# Patient Record
Sex: FEMALE | URBAN - METROPOLITAN AREA
[De-identification: names, ages, dates, MRNs, and addresses within clinical notes are randomized per-mention and may not be internally consistent; named-entity substitution may affect disease eponyms.]

---

## 2019-12-16 ENCOUNTER — APPOINTMENT (RX ONLY)
Dept: URBAN - METROPOLITAN AREA OTHER 10 | Facility: OTHER | Age: 74
Setting detail: DERMATOLOGY
End: 2019-12-16

## 2019-12-16 DIAGNOSIS — L28.0 LICHEN SIMPLEX CHRONICUS: ICD-10-CM

## 2019-12-16 PROBLEM — M12.9 ARTHROPATHY, UNSPECIFIED: Status: ACTIVE | Noted: 2019-12-16

## 2019-12-16 PROBLEM — E78.5 HYPERLIPIDEMIA, UNSPECIFIED: Status: ACTIVE | Noted: 2019-12-16

## 2019-12-16 PROCEDURE — ? PRESCRIPTION

## 2019-12-16 PROCEDURE — 99202 OFFICE O/P NEW SF 15 MIN: CPT

## 2019-12-16 PROCEDURE — ? COUNSELING

## 2019-12-16 PROCEDURE — ? TREATMENT REGIMEN

## 2019-12-16 RX ORDER — CLOBETASOL PROPIONATE 0.5 MG/G
CREAM TOPICAL BID
Qty: 60 | Refills: 1 | Status: ERX | COMMUNITY
Start: 2019-12-16

## 2019-12-16 RX ADMIN — CLOBETASOL PROPIONATE: 0.5 CREAM TOPICAL at 00:00

## 2019-12-16 ASSESSMENT — LOCATION SIMPLE DESCRIPTION DERM: LOCATION SIMPLE: RIGHT ELBOW

## 2019-12-16 ASSESSMENT — LOCATION DETAILED DESCRIPTION DERM: LOCATION DETAILED: RIGHT ELBOW

## 2019-12-16 ASSESSMENT — LOCATION ZONE DERM: LOCATION ZONE: ARM

## 2019-12-16 NOTE — HPI: DISCOLORATION
How Severe Is Your Skin Discoloration?: moderate
Additional History: Patient states the lesions are painful and she was given Mupirocin but there was no change

## 2023-03-20 ENCOUNTER — OFFICE VISIT (OUTPATIENT)
Dept: URBAN - METROPOLITAN AREA CLINIC 74 | Facility: CLINIC | Age: 78
End: 2023-03-20

## 2023-03-20 PROBLEM — 71457002: Status: ACTIVE | Noted: 2023-03-20

## 2023-03-20 NOTE — HPI-TODAY'S VISIT:
The patient is 77-year-old female with past medical history as noted below is presenting to our clinic today to schedule Colonoscopy. The patient was seen on 03/06/2023 in ED at  for right flank pain. She was aslo seen on 03/17/2023 and admitted to  for UTI and headache. She was seen by [facility] during this admission.  Labs and imaging as noted below.     Diagnostic studies: -- MBS on 03/20/2023   -- Labs on 03/20/2023 CBC with WBC 16.93, hemoglobin 13.9, hematocrit 41.8, and platelet 13.7.  BMP with glucose of 189, BUN 14, and creatinine 1.78.  -- CT scan on 03/06/2023 with stomach is unremarkable. Small and large bowel loops are of normal caliber. No free intraperitoneal air or free fluid is demonstrated. The mesentery is unremarkable. Scattered colonic diverticulosis without evidence of diverticulitis  The appendix is normal in appearance. No findings of an acute intra-abdominal or pelvic abnormality. Specifically no findings of hydronephrosis or urolithiasis. Indication for right flank pain. Atherosclerotic aortic disease.   -- Labs on 03/06/2023 CBC Hgb 15.4, Hct 44.6, and . BMP with Creatinine 0.67, Glucose 240, and Potassium 5.7. COVID test negative. UA with positive for glucose and ketones.

## 2023-04-18 PROBLEM — 266434009: Status: ACTIVE | Noted: 2023-04-18

## 2023-05-10 ENCOUNTER — LAB OUTSIDE AN ENCOUNTER (OUTPATIENT)
Dept: URBAN - METROPOLITAN AREA CLINIC 74 | Facility: CLINIC | Age: 78
End: 2023-05-10

## 2023-05-10 ENCOUNTER — OFFICE VISIT (OUTPATIENT)
Dept: URBAN - METROPOLITAN AREA CLINIC 74 | Facility: CLINIC | Age: 78
End: 2023-05-10
Payer: MEDICARE

## 2023-05-10 ENCOUNTER — WEB ENCOUNTER (OUTPATIENT)
Dept: URBAN - METROPOLITAN AREA CLINIC 74 | Facility: CLINIC | Age: 78
End: 2023-05-10

## 2023-05-10 ENCOUNTER — TELEPHONE ENCOUNTER (OUTPATIENT)
Dept: URBAN - METROPOLITAN AREA CLINIC 74 | Facility: CLINIC | Age: 78
End: 2023-05-10

## 2023-05-10 VITALS
BODY MASS INDEX: 32.59 KG/M2 | HEIGHT: 65 IN | WEIGHT: 195.6 LBS | HEART RATE: 94 BPM | SYSTOLIC BLOOD PRESSURE: 120 MMHG | DIASTOLIC BLOOD PRESSURE: 60 MMHG | TEMPERATURE: 97.3 F

## 2023-05-10 DIAGNOSIS — R10.824 LEFT LOWER QUADRANT ABDOMINAL TENDERNESS WITH REBOUND TENDERNESS: ICD-10-CM

## 2023-05-10 DIAGNOSIS — K21.9 CHRONIC GASTROESOPHAGEAL REFLUX DISEASE: ICD-10-CM

## 2023-05-10 DIAGNOSIS — R10.31 RLQ ABDOMINAL PAIN: ICD-10-CM

## 2023-05-10 DIAGNOSIS — J69.0 ASPIRATION PNEUMONIA OF BOTH LUNGS, UNSPECIFIED ASPIRATION PNEUMONIA TYPE, UNSPECIFIED PART OF LUNG: ICD-10-CM

## 2023-05-10 DIAGNOSIS — K59.09 CHRONIC CONSTIPATION: ICD-10-CM

## 2023-05-10 DIAGNOSIS — K57.90 DIVERTICULOSIS: ICD-10-CM

## 2023-05-10 DIAGNOSIS — K22.4 ESOPHAGEAL DYSKINESIA: ICD-10-CM

## 2023-05-10 DIAGNOSIS — R10.32 LLQ ABDOMINAL PAIN: ICD-10-CM

## 2023-05-10 PROBLEM — 235595009: Status: ACTIVE | Noted: 2023-05-10

## 2023-05-10 PROBLEM — 307496006: Status: ACTIVE | Noted: 2023-05-10

## 2023-05-10 PROBLEM — 397881000: Status: ACTIVE | Noted: 2023-05-10

## 2023-05-10 PROCEDURE — 99204 OFFICE O/P NEW MOD 45 MIN: CPT | Performed by: PHYSICIAN ASSISTANT

## 2023-05-10 RX ORDER — POLYETHYLENE GLYCOL 3350 17 G/17G
AS DIRECTED POWDER, FOR SOLUTION ORAL
Qty: 238 G | Refills: 0 | OUTPATIENT
Start: 2023-05-10 | End: 2023-05-11

## 2023-05-10 NOTE — HPI-TODAY'S VISIT:
The patient is 77-year-old female with past medical history as noted below is presenting to our clinic today to schedule Colonoscopy. The patient was seen on 03/06/2023 in ED at  for right flank pain. She was aslo once again seen on 03/17/2023 and admitted to  for UTI and headache with discharged on 03/28/2023 to [facility]. She was seen by [facility] during this admission.  Labs and imaging as noted below. No endoscopy procedures. PEG tube placement was discussed due to recurrent aspiration pneumonia and the patient decline.  She is doing well with swallwoing and she denies any cough or respiratory symptoms. She has issues with constipation and lower abdominal pain.  She moves her bowel every 1-2 days. She takes prune juice and probiotic which helps with her bowel movements. Last Colonoscopy over 10 years ago. No EGD in the past. She takes Omeprazole 40 mg once daily.  No other GI isuess today.   -- The patient denies dyspepsia, dysphagia, odynophagia, hemoptysis, hematemesis, nausea, vomiting, regurgitation, melena, diarrhea, abdominal pain, hematochezia, fever, chills, chest pain, SOB, or any other GI complaints today.  -- The patient denies ETOH, Tobacco, and Illicit drug use.  -- The patient is up to date with COVID vaccine 3/3. -- Denies NSAID's.    Diagnostic studies: -- BS on 03/21/2023 with presbyesophagus and esophageal dyskinesia noted. Bolus contrast failed to propagate normally from the level of clavicles, barium column to the level of the clavicles noted. The barium did pass slowly with repeated swallows. No persistent areas of narrowing or dilatation. Barium tablet did get up at the GE junction but passed with adequate thin liquid bolus. No evidence of hiatal hernia. No evidence of reflux while in the upright position. Recumbent images were not obtained as there was inferior of aspiration.    -- MBS on 03/20/2023 substantial residuals within the vallecula and piriform sinuses with more opacity of the upper esophageal sphincter. One episode of aspiration. Consider further imaging evaluation.  -- Labs on 03/20/2023 CBC with WBC 16.93, hemoglobin 13.9, hematocrit 41.8, and platelet 13.7.  BMP with glucose of 189, BUN 14, and creatinine 1.78.  -- Labs on 03/06/2023 CBC Hgb 15.4, Hct 44.6, and . BMP with Creatinine 0.67, Glucose 240, and Potassium 5.7. COVID test negative. UA with positive for glucose and ketones.  -- CT scan on 03/06/2023 with stomach is unremarkable. Small and large bowel loops are of normal caliber. No free intraperitoneal air or free fluid is demonstrated. The mesentery is unremarkable. Scattered colonic diverticulosis without evidence of diverticulitis  The appendix is normal in appearance. No findings of an acute intra-abdominal or pelvic abnormality. Specifically no findings of hydronephrosis or urolithiasis. Indication for right flank pain. Atherosclerotic aortic disease.

## 2023-05-10 NOTE — PHYSICAL EXAM GASTROINTESTINAL
Abdomen , soft, lower abdominal tenderness left greater than right, rebounded tenderness, nondistended , no guarding or rigidity , no masses palpable , normal bowel sounds , Liver and Spleen , no hepatomegaly present , no hepatosplenomegaly , liver nontender , spleen not palpable

## 2023-05-11 ENCOUNTER — TELEPHONE ENCOUNTER (OUTPATIENT)
Dept: URBAN - METROPOLITAN AREA CLINIC 74 | Facility: CLINIC | Age: 78
End: 2023-05-11

## 2023-05-16 ENCOUNTER — OFFICE VISIT (OUTPATIENT)
Dept: URBAN - METROPOLITAN AREA SURGERY CENTER 30 | Facility: SURGERY CENTER | Age: 78
End: 2023-05-16
Payer: MEDICARE

## 2023-05-16 ENCOUNTER — CLAIMS CREATED FROM THE CLAIM WINDOW (OUTPATIENT)
Dept: URBAN - METROPOLITAN AREA CLINIC 4 | Facility: CLINIC | Age: 78
End: 2023-05-16
Payer: MEDICARE

## 2023-05-16 DIAGNOSIS — K29.70 GASTRITIS, UNSPECIFIED, WITHOUT BLEEDING: ICD-10-CM

## 2023-05-16 DIAGNOSIS — K29.60 ADENOPAPILLOMATOSIS GASTRICA: ICD-10-CM

## 2023-05-16 PROCEDURE — 43239 EGD BIOPSY SINGLE/MULTIPLE: CPT | Performed by: INTERNAL MEDICINE

## 2023-05-16 PROCEDURE — 88312 SPECIAL STAINS GROUP 1: CPT | Performed by: PATHOLOGY

## 2023-05-16 PROCEDURE — G8907 PT DOC NO EVENTS ON DISCHARG: HCPCS | Performed by: INTERNAL MEDICINE

## 2023-05-16 PROCEDURE — 88305 TISSUE EXAM BY PATHOLOGIST: CPT | Performed by: PATHOLOGY

## 2023-05-16 RX ORDER — SUCRALFATE 1 G/1
1 TABLET ON AN EMPTY STOMACH TABLET ORAL TWICE A DAY
Qty: 60 TABLET | Refills: 3 | OUTPATIENT
Start: 2023-05-16 | End: 2023-09-13

## 2023-05-16 RX ORDER — PANTOPRAZOLE SODIUM 40 MG/1
1 TABLET TABLET, DELAYED RELEASE ORAL ONCE A DAY
Qty: 90 TABLET | Refills: 3 | OUTPATIENT
Start: 2023-05-16

## 2023-05-24 ENCOUNTER — DASHBOARD ENCOUNTERS (OUTPATIENT)
Age: 78
End: 2023-05-24

## 2023-06-19 ENCOUNTER — OFFICE VISIT (OUTPATIENT)
Dept: URBAN - METROPOLITAN AREA SURGERY CENTER 30 | Facility: SURGERY CENTER | Age: 78
End: 2023-06-19

## 2023-07-17 ENCOUNTER — OFFICE VISIT (OUTPATIENT)
Dept: URBAN - METROPOLITAN AREA CLINIC 74 | Facility: CLINIC | Age: 78
End: 2023-07-17

## 2023-07-17 RX ORDER — PANTOPRAZOLE SODIUM 40 MG/1
1 TABLET TABLET, DELAYED RELEASE ORAL ONCE A DAY
Qty: 90 TABLET | Refills: 3 | Status: ACTIVE | COMMUNITY
Start: 2023-05-16

## 2023-07-17 RX ORDER — SUCRALFATE 1 G/1
1 TABLET ON AN EMPTY STOMACH TABLET ORAL TWICE A DAY
Qty: 60 TABLET | Refills: 3 | Status: ACTIVE | COMMUNITY
Start: 2023-05-16 | End: 2023-09-13

## 2023-07-17 NOTE — HPI-TODAY'S VISIT:
The patient is 77-year-old female with past medical history as noted below known to Dr. Medina is presenting to our clinic today to discuss her recent EGD and Colonoscopy results. Multiple recent hospital admissions with labs and imaging as noted  below.   -- The patient denies dyspepsia, dysphagia, odynophagia, hemoptysis, hematemesis, nausea, vomiting, regurgitation, melena, diarrhea, abdominal pain, hematochezia, fever, chills, chest pain, SOB, or any other GI complaints today.  -- The patient denies ETOH, Tobacco, and Illicit drug use.  -- The patient is up to date with COVID vaccine 3/3. -- Denies NSAID's.    Diagnostic studies: -- BS on 03/21/2023 with presbyesophagus and esophageal dyskinesia noted. Bolus contrast failed to propagate normally from the level of clavicles, barium column to the level of the clavicles noted. The barium did pass slowly with repeated swallows. No persistent areas of narrowing or dilatation. Barium tablet did get up at the GE junction but passed with adequate thin liquid bolus. No evidence of hiatal hernia. No evidence of reflux while in the upright position. Recumbent images were not obtained as there was inferior of aspiration.    -- MBS on 03/20/2023 substantial residuals within the vallecula and piriform sinuses with more opacity of the upper esophageal sphincter. One episode of aspiration. Consider further imaging evaluation.  -- Labs on 03/20/2023 CBC with WBC 16.93, hemoglobin 13.9, hematocrit 41.8, and platelet 13.7.  BMP with glucose of 189, BUN 14, and creatinine 1.78.  -- Labs on 03/06/2023 CBC Hgb 15.4, Hct 44.6, and . BMP with Creatinine 0.67, Glucose 240, and Potassium 5.7. COVID test negative. UA with positive for glucose and ketones.  -- CT scan on 03/06/2023 with stomach is unremarkable. Small and large bowel loops are of normal caliber. No free intraperitoneal air or free fluid is demonstrated. The mesentery is unremarkable. Scattered colonic diverticulosis without evidence of diverticulitis  The appendix is normal in appearance. No findings of an acute intra-abdominal or pelvic abnormality. Specifically no findings of hydronephrosis or urolithiasis. Indication for right flank pain. Atherosclerotic aortic disease.  Procedures: -- Colonoscopy    -- EGD with biopsy on 05/16/2023 by Dr. Medina noted normal esophagus.  Gastritis.  Normal examined duodenum.  Biopsy of the stomach with chronic gastritis.  No H. pylori organisms noted.  No intestinal metaplasia.